# Patient Record
Sex: MALE | URBAN - METROPOLITAN AREA
[De-identification: names, ages, dates, MRNs, and addresses within clinical notes are randomized per-mention and may not be internally consistent; named-entity substitution may affect disease eponyms.]

---

## 2017-08-30 ENCOUNTER — OFFICE VISIT (OUTPATIENT)
Dept: INTERNAL MEDICINE CLINIC | Age: 42
End: 2017-08-30

## 2017-08-30 VITALS
HEART RATE: 80 BPM | WEIGHT: 252 LBS | OXYGEN SATURATION: 98 % | HEIGHT: 75 IN | RESPIRATION RATE: 16 BRPM | SYSTOLIC BLOOD PRESSURE: 122 MMHG | BODY MASS INDEX: 31.33 KG/M2 | DIASTOLIC BLOOD PRESSURE: 88 MMHG | TEMPERATURE: 97.9 F

## 2017-08-30 DIAGNOSIS — K21.9 GASTROESOPHAGEAL REFLUX DISEASE WITHOUT ESOPHAGITIS: ICD-10-CM

## 2017-08-30 DIAGNOSIS — R09.81 SINUS CONGESTION: Primary | ICD-10-CM

## 2017-08-30 RX ORDER — AZITHROMYCIN 250 MG/1
TABLET, FILM COATED ORAL
Qty: 6 TAB | Refills: 1 | Status: SHIPPED | OUTPATIENT
Start: 2017-08-30 | End: 2017-09-04

## 2017-08-30 RX ORDER — PREDNISONE 20 MG/1
TABLET ORAL
Qty: 20 TAB | Refills: 0 | Status: SHIPPED | OUTPATIENT
Start: 2017-08-30

## 2017-08-30 RX ORDER — AZELASTINE 1 MG/ML
1 SPRAY, METERED NASAL 2 TIMES DAILY
COMMUNITY

## 2017-08-30 RX ORDER — CETIRIZINE HCL 10 MG
TABLET ORAL
COMMUNITY

## 2017-08-30 RX ORDER — OMEPRAZOLE 20 MG/1
20 CAPSULE, DELAYED RELEASE ORAL DAILY
COMMUNITY

## 2017-08-30 RX ORDER — AZELASTINE HYDROCHLORIDE 0.5 MG/ML
SOLUTION/ DROPS OPHTHALMIC 2 TIMES DAILY
COMMUNITY

## 2017-08-30 RX ORDER — FLUTICASONE PROPIONATE 50 MCG
2 SPRAY, SUSPENSION (ML) NASAL DAILY
COMMUNITY

## 2017-08-30 NOTE — PROGRESS NOTES
The patient presents to the office today with the chief complaint of Sinus Congestion    HPI    The patient complains of sinus congestion with drainage and a cough. he denie fever. The problem has been present for 2 months. The patient has used Astelin, Flonase and Claritin. The symptoms persist.  The patient's parents had been sick with an URI. The patient remains on medications for esophogeal reflux. Review of Systems   HENT: Positive for congestion. Respiratory: Negative for shortness of breath. Cardiovascular: Negative for chest pain and leg swelling. No Known Allergies    Current Outpatient Prescriptions   Medication Sig Dispense Refill    cetirizine (ZYRTEC) 10 mg tablet Take  by mouth.  azelastine (ASTELIN) 137 mcg (0.1 %) nasal spray 1 Spray two (2) times a day. Use in each nostril as directed      azelastine (OPTIVAR) 0.05 % ophthalmic solution Administer  to both eyes two (2) times a day. Use in affected eye(s)      fluticasone (FLONASE) 50 mcg/actuation nasal spray 2 Sprays by Both Nostrils route daily.  omeprazole (PRILOSEC) 20 mg capsule Take 20 mg by mouth daily. Indications: PRN      azithromycin (ZITHROMAX) 250 mg tablet Take 2 tablets today, then take 1 tablet daily 6 Tab 1    predniSONE (DELTASONE) 20 mg tablet 3 tabs daily x 3 days, 2 tabs daily x 3 days, 1 tab daily x 3 days, 1/2 tab daily x 3 days 20 Tab 0       Past Medical History:   Diagnosis Date    Pneumothorax 1992       History reviewed. No pertinent surgical history. Social History     Social History    Marital status: UNKNOWN     Spouse name: N/A    Number of children: N/A    Years of education: N/A     Occupational History    Not on file.      Social History Main Topics    Smoking status: Never Smoker    Smokeless tobacco: Never Used    Alcohol use No    Drug use: No    Sexual activity: Not on file     Other Topics Concern    Not on file     Social History Narrative    No narrative on file       Patient does not have an advanced directive on file    Visit Vitals    /88 (BP 1 Location: Left arm, BP Patient Position: Sitting)    Pulse 80    Temp 97.9 °F (36.6 °C) (Tympanic)    Resp 16    Ht 6' 3\" (1.905 m)    Wt 252 lb (114.3 kg)    SpO2 98%    BMI 31.5 kg/m2       Physical Exam   Ears:  Normal  Oral Pharynx:  Normal  No Cervical Lymphadenopathy  No Supraclavicular Lymphadenopathy  Thyroid is Normal  Lungs are clear to ausculation and percussion  Heart:  S1 S2 are normal, No gallops, No mummers  No Carotid Bruits  Abdomen:  Normal Bowel Sounds. No tenderness. No masses. No Hepatomegaly or Splenomegly  LE:  Strong Pedal Pulses. No Edema    BMI:  I have reviewed/discussed the above normal BMI with the patient. I have recommended the following interventions: dietary management education, guidance, and counseling . Sana [place] No results found for any previous visit. .No results found for any visits on 08/30/17. Assessment / Plan      ICD-10-CM ICD-9-CM    1. Sinus congestion R09.81 478.19    2. Gastroesophageal reflux disease without esophagitis K21.9 530.81      Z Teodoro  Prednisone  he was advised to continue his maintenance medications  he is to call if symptoms persist over five days    Follow-up Disposition:  Return in about 7 months (around 3/30/2018). I asked Shruthi Han if he has any questions and I answered the questions.   Shruthi Han states that he understands the treatment plan and agrees with the treatment plan

## 2017-08-30 NOTE — PATIENT INSTRUCTIONS
Health Maintenance Due   Topic Date Due    DTaP/Tdap/Td  (1 - Tdap) 05/13/1995    Flu Vaccine  08/01/2017

## 2017-08-30 NOTE — PROGRESS NOTES
1. Have you been to the ER, urgent care clinic since your last visit? Hospitalized since your last visit? [facility] - cough    2. Have you seen or consulted any other health care providers outside of the [address] [place] [address] since your last visit? Include any pap smears or colon screening.  No